# Patient Record
Sex: MALE
[De-identification: names, ages, dates, MRNs, and addresses within clinical notes are randomized per-mention and may not be internally consistent; named-entity substitution may affect disease eponyms.]

---

## 2021-01-25 PROBLEM — Z00.00 ENCOUNTER FOR PREVENTIVE HEALTH EXAMINATION: Status: ACTIVE | Noted: 2021-01-25

## 2021-02-03 ENCOUNTER — APPOINTMENT (OUTPATIENT)
Dept: ENDOCRINOLOGY | Facility: CLINIC | Age: 26
End: 2021-02-03
Payer: COMMERCIAL

## 2021-02-03 VITALS
SYSTOLIC BLOOD PRESSURE: 122 MMHG | DIASTOLIC BLOOD PRESSURE: 78 MMHG | HEART RATE: 41 BPM | OXYGEN SATURATION: 98 % | WEIGHT: 135 LBS | BODY MASS INDEX: 19.99 KG/M2 | HEIGHT: 69 IN

## 2021-02-03 VITALS — HEART RATE: 78 BPM

## 2021-02-03 DIAGNOSIS — R79.89 OTHER SPECIFIED ABNORMAL FINDINGS OF BLOOD CHEMISTRY: ICD-10-CM

## 2021-02-03 DIAGNOSIS — N62 HYPERTROPHY OF BREAST: ICD-10-CM

## 2021-02-03 PROCEDURE — 99072 ADDL SUPL MATRL&STAF TM PHE: CPT

## 2021-02-03 PROCEDURE — 99203 OFFICE O/P NEW LOW 30 MIN: CPT

## 2021-02-03 NOTE — HISTORY OF PRESENT ILLNESS
[FreeTextEntry1] : Feb 03, 2021\par \par PCP:  Jarek Cerna - \par \par CC:  "Low testosterone", gynecomastia\par \par 26 yo [employer].\par December 7 at ED for convulsions from recreational self medication. Phenibut - online supplement.\par Helps with anxiety. \par \par He brings in labs from 7/3/2019 from online lab before starting on testosterone at which time \par testosterone was 702 \par After starting on testosteroene he repeated labs 731 at which time Te over 1500 and Hct still 40.4\par \par Started on an antidepressant this week by Dr. Cox, - she is in the [place]. Citalopram x 4 days.  \par Also started on a beta blocker - propranolol 40 mg in AM.\par \par : :\par Constitutional:  Alert, well nourished, healthy appearance, no acute distress \par Eyes:  No proptosis, no stare\par Thyroid:\par Pulmonary:  No respiratory distress, no accessory muscle use; normal rate and effort\par Cardiac:  Normal rate\par Vascular: \par Endocrine:  No stigmata of Cushing’s Syndrome  Minimal excess breast tissue, non-tender\par Musculoskeletal:  Normal gait, no involuntary movements\par Neurology:  No tremors\par Affect/Mood/Psych:  Oriented x 3; normal affect, normal insight/judgement, normal mood \par .\par  \par \par Impression:  Gynecomastia  - minmal - likely related to testosterone supplementation\par History of low testosterone.  \par \par Plan:  He will stay off of testosterone supplementation.\par He will stop by [facility] for updated labs and we will review at videochat.\par \par

## 2021-02-08 ENCOUNTER — NON-APPOINTMENT (OUTPATIENT)
Age: 26
End: 2021-02-08

## 2021-02-08 ENCOUNTER — APPOINTMENT (OUTPATIENT)
Dept: ENDOCRINOLOGY | Facility: CLINIC | Age: 26
End: 2021-02-08
